# Patient Record
(demographics unavailable — no encounter records)

---

## 2024-10-29 NOTE — HISTORY OF PRESENT ILLNESS
[FreeTextEntry1] : f/u [de-identified] : Calcification of artery (440.9) (I70.90)  Ms. EMERSON DAY is a 75 year old Black or  Marcella female with history of breast fibrocystic disorder, T2DM, dyslipidemia, eczema, insomnia, midline cystocele, osteopenia, urge urinary incontinence, varicose vein presented today for f/u BP.  # HTN BP improved than prior. 145/80 Stressed low salt diet, slow down when changing positions and plenty of oral fluid intake day time. Continue Amlodipine 2.5mg po bid and Losartan 25mg po qd.  # accidental finding of BCA grade 2 calcification by mammo 7/3/24. BREAST ARTERIAL CALCIFICATION (HANNAH): Grade 2 - Coarse vascular calcification or tram track calcification affecting fewer than 3 vessels per mammographic view. Note: A strong association between breast arterial calcification and cardiovascular disease has been reported. Correlation with other cardiovascular risk factors is recommended. - 10 year ASCVD is elevated to 26.54%, Pt is on Atorvastatin 20mg po qd. - D/W Dr. Rivera. Made referral to Dr. Sharp, cardiology for further cardiac evaluation.  Answered all questions on her lab results on physical exam. RTO in 3months for DM, BP.

## 2025-01-15 NOTE — REASON FOR VISIT
[TextEntry] : C/O VULVOVAG ITCHING.  NO RECENT ABX.  DENIES DISCHARGE.  USED MONISTAT W PARTIAL IMPROVEMENT.  HAS GELHORN PESSARY- PESSARY OCCAS FALLS OUT W TAVIAIN BUT HAS NOT SEEN UROGYN IN RECENT MTHS

## 2025-01-15 NOTE — PHYSICAL EXAM
[Normal] : normal [Cystocele] : a cystocele [FreeTextEntry1] : MILD VULVAR ERYTHEMA;  VITELIGO [TextEntry] : GELHORN PESSARY REMOVED AND CLEANED W BETADINE AND REINSERTED

## 2025-01-22 NOTE — HISTORY OF PRESENT ILLNESS
[de-identified] : 75-year-old AA F with history of T2 DM, dyslipidemia, hypertension, osteopenia, here for BP follow-up.   Concerns 2025: -Hypertension-elevated systolic BP noted in June 2024, losartan 25 mg added to the amlodipine with dosage of losartan increased to 50 mg daily by cardiology follow-up September 2024.  She received cardiology referral after mammogram showed arterial calcifications.  Was referred for echocardiogram but has not gone yet.- [id number] metformin 500 mg twice daily, last A1c 6.7% June 2024 Hyperlipidemia with LDL 78 June 2024 Concerns 2025: Needs the second vaccine for shingles, first 1 received June 26, 2024

## 2025-03-05 NOTE — HISTORY OF PRESENT ILLNESS
[FreeTextEntry8] : Ms. EMERSON DAY is a 75 year old Black or  Marcella  female  with history of  breast fibrocystic disorder, T2DM, dyslipidemia, eczema, insomnia, midline cystocele, osteopenia, urge urinary incontinence, varicose vein  presented today for f/u fall yesterday. [Post-hospitalization from ___ Hospital] : Post-hospitalization from [unfilled] Hospital [Admitted on: ___] : The patient was admitted on [unfilled] [Discharged on ___] : discharged on [unfilled] [FreeTextEntry2] : Ms. EMERSON DAY is a 75 year old Black or  Marcella  female  with history of  presented today for comprehensive evaluation.

## 2025-03-26 NOTE — HISTORY OF PRESENT ILLNESS
[FreeTextEntry1] : Ms. DAY is a 75 year female here for cardiac evaluation after mammogram showed arterial calcifications.  Notable PMHx: - HTN - HLD - DM2 - Varicose Veins  - never smoker - 4 prior pregnancies uncomplicated   HPI: Since last visit, she had an admission to the hospital for mechanical fall with sprain to right thumb. Otherwise unremarkable workup. She is no longer taking her atorvastatin though she is unsure why, no side effects with this. She also has been taking losartan 25mg qd rather than 50mg as prescribed at last visit. She is on amlodipine 2.5mg BID but often forgets the evening dose. No chest pain, calf claudication, or dyspnea. TTE with mild MR and G2DD otherwise unremarkable.   9/11/2024 Here for cardiac evaluation after recent mammogram incidentally found breast arterial calcifications and the concern for association with cardiovascular disease. She reports no current issues and is feeling well. 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 She never has chest pain or dyspnea with this. Denies chest pain, palpitations, light-headedness/dizziness, syncope, orthopnea, PND, calf claudication.  Social Hx: never smoker, no alcohol use, retired, , 4 children, lives alone   Data Review: EKG - 4/19/2023: Sinus bradycardia with PACs. Voltage criteria for LVH Echo - 3/11/25 TTE: LVEF 60%, no rwma, G2DD, normal RV, mild MR, mild pHTN, no pericardial effusion, no prior echo Cath - no prior Cardiac CT - no prior Cardiac MRI - no prior Mammogram (7/3/2024) - BREAST ARTERIAL CALCIFICATION (HANNAH): Grade 2 -  Coarse vascular calcification or tram track calcification affecting fewer than 3 vessels per mammographic view. Note: A strong association between breast arterial calcification and cardiovascular disease has been reported. Correlation with other cardiovascular risk factors is recommended.

## 2025-03-26 NOTE — DISCUSSION/SUMMARY
[FreeTextEntry1] : Here for follow-up for HTN, HLDa,d and incidental breast arterial calcifications. Her recent BP has been mostly uncontrolled with G2DD on TTE. Given her suboptimal amlodipine adherence will change to 5mg qd  and continue losartan 25mg qd for now. Restart atorvastatin 20mg qd and repeat lipids prior to next visit.  # HTN - change amlodipine 2.5mg BID to 5mg qd - c/w losartan 25mg qd - BP log at home - CMP  # DM2 # HLD # ASCVD Risk >20% - restart atorvastatin 20mg qd - lipid panel   # Snoring # Mild pHTN - will discuss sleep apnea testing at future visit  RTC in 3 months following above testing or sooner PRN.

## 2025-03-26 NOTE — REASON FOR VISIT
[Home] : at home, [unfilled] , at the time of the visit. [Other Location: e.g. Home (Enter Location, City,State)___] : at [unfilled] [Telephone (audio)] : This telephonic visit was provided via audio only technology. [Patient preference] : patient preference. [Verbal consent obtained from patient] : the patient, [unfilled]

## 2025-06-25 NOTE — HISTORY OF PRESENT ILLNESS
[FreeTextEntry1] : Ms. DAY is a 76 year female here for cardiac evaluation after mammogram showed arterial calcifications.  Notable PMHx: - HTN - HLD - DM2 - Varicose Veins  - never smoker - 4 prior pregnancies uncomplicated   HPI: Since last visit, she had an admission to the hospital for mechanical fall with sprain to right thumb. Otherwise unremarkable workup. She is no longer taking her atorvastatin though she is unsure why, no side effects with this. She also has been taking losartan 25mg qd rather than 50mg as prescribed at last visit. She is on amlodipine 2.5mg BID but often forgets the evening dose. No chest pain, calf claudication, or dyspnea. TTE with mild MR and G2DD otherwise unremarkable.   9/11/2024 Here for cardiac evaluation after recent mammogram incidentally found breast arterial calcifications. She reports no current issues and is feeling well. 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 She never has chest pain or dyspnea with this. Denies chest pain, palpitations, light-headedness/dizziness, syncope, orthopnea, PND, calf claudication.  Social Hx: never smoker, no alcohol use, retired, , 4 children, lives alone   Data Review:  Echo - 3/11/25 TTE: LVEF 60%, no rwma, G2DD, normal RV, mild MR, mild pHTN, no pericardial effusion, no prior echo Cath -  Cardiac CT -  Cardiac MRI -  Mammogram (7/3/2024) - BREAST ARTERIAL CALCIFICATION (HANNAH): Grade 2 -  Coarse vascular calcification or tram track calcification affecting fewer than 3 vessels per mammographic view. Note: A strong association between breast arterial calcification and cardiovascular disease has been reported. Correlation with other cardiovascular risk factors is recommended.  ====  Here for follow-up for HTN, HLD, and incidental breast arterial calcifications. Her recent BP has been mostly uncontrolled with G2DD on TTE. Given her suboptimal amlodipine adherence will change to 5mg qd and continue losartan 25mg qd for now. Restart atorvastatin 20mg qd and repeat lipids prior to next visit.  # HTN - change amlodipine 2.5mg BID to 5mg qd - c/w losartan 25mg qd - BP log at home - CMP  # DM2 # HLD # ASCVD Risk >20% - restart atorvastatin 10mg qd - lipid panel  # Snoring # Mild pHTN - will discuss sleep apnea testing at future visit  RTC in 3 months following above testing or sooner PRN.

## 2025-06-25 NOTE — HISTORY OF PRESENT ILLNESS
[FreeTextEntry1] : Ms. DAY is a 76 year female here for cardiac evaluation after mammogram showed arterial calcifications.  Notable PMHx: - HTN - HLD - DM2 - Varicose Veins  - never smoker - 4 prior pregnancies uncomplicated   HPI: Since last visit, she had an admission to the hospital for mechanical fall with sprain to right thumb. Otherwise unremarkable workup. She is no longer taking her atorvastatin though she is unsure why, no side effects with this. She also has been taking losartan 25mg qd rather than 50mg as prescribed at last visit. She is on amlodipine 2.5mg BID but often forgets the evening dose. No chest pain, calf claudication, or dyspnea. TTE with mild MR and G2DD otherwise unremarkable.   9/11/2024 Here for cardiac evaluation after recent mammogram incidentally found breast arterial calcifications. She reports no current issues and is feeling well. 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 She never has chest pain or dyspnea with this. Denies chest pain, palpitations, light-headedness/dizziness, syncope, orthopnea, PND, calf claudication.  Social Hx: never smoker, no alcohol use, retired, , 4 children, lives alone   Data Review:  Echo - 3/11/25 TTE: LVEF 60%, no rwma, G2DD, normal RV, mild MR, mild pHTN, no pericardial effusion, no prior echo Cath -  Cardiac CT -  Cardiac MRI -  Mammogram (7/3/2024) - BREAST ARTERIAL CALCIFICATION (HANNAH): Grade 2 -  Coarse vascular calcification or tram track calcification affecting fewer than 3 vessels per mammographic view. Note: A strong association between breast arterial calcification and cardiovascular disease has been reported. Correlation with other cardiovascular risk factors is recommended.  ====  Here for follow-up for HTN, HLD, and incidental breast arterial calcifications. Her recent BP has been mostly uncontrolled with G2DD on TTE. Given her suboptimal amlodipine adherence will change to 5mg qd and continue losartan 25mg qd for now. Restart atorvastatin 20mg qd and repeat lipids prior to next visit.  # HTN - change amlodipine 2.5mg BID to 5mg qd - c/w losartan 25mg qd - BP log at home - CMP  # DM2 # HLD # ASCVD Risk >20% - restart atorvastatin 10mg qd - lipid panel  # Snoring # Mild pHTN - will discuss sleep apnea testing at future visit  RTC in 3 months following above testing or sooner PRN.

## 2025-07-01 NOTE — HISTORY OF PRESENT ILLNESS
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 Has 4 children (3 boys1 daughter) PMedHx: RLE cellulitis 2023 presented with swelling and warmth in right lower leg, c/w cellulitis x 3 weeks, no trauma, no hx gout, started on Augmentin and Doppler of RLE negative for DVT.  DM, started on metformin in Oct 2017 for  HbA1c 8.5% now 6.7%-6.8%  in [id number]  HTN, on Amlodipine Hyperlipidemia-on Atorvastatin 20mg.-LDL 75-79 -Trying to make healthier food choices, less carbs, weight in low 150's BMI 25.4 Hx of osteopenia-last bone density in 2021-has prescription from May 29, 2024 -Hx constipation-colonoscopy [id number] tubular adenomas, 2021-nl-f/u in 10 years  -Mammogram 7/3/2024-needs prescription for

## 2025-07-01 NOTE — HEALTH RISK ASSESSMENT
[No] : No [No falls in past year] : Patient reported no falls in the past year [0] : 2) Feeling down, depressed, or hopeless: Not at all (0) [PHQ-2 Negative - No further assessment needed] : PHQ-2 Negative - No further assessment needed [Audit-CScore] : 0 [[id number]] : 0

## 2025-07-01 NOTE — PHYSICAL EXAM
[Well Nourished] : well nourished [Well Developed] : well developed [Well-Appearing] : well-appearing [Normal Sclera/Conjunctiva] : normal sclera/conjunctiva [PERRL] : pupils equal round and reactive to light [EOMI] : extraocular movements intact [Normal Outer Ear/Nose] : the outer ears and nose were normal in appearance [Normal Oropharynx] : the oropharynx was normal [No JVD] : no jugular venous distention [No Lymphadenopathy] : no lymphadenopathy [Supple] : supple [Thyroid Normal, No Nodules] : the thyroid was normal and there were no nodules present [No Respiratory Distress] : no respiratory distress  [No Accessory Muscle Use] : no accessory muscle use [Clear to Auscultation] : lungs were clear to auscultation bilaterally [Normal Rate] : normal rate  [Regular Rhythm] : with a regular rhythm [Normal S1, S2] : normal S1 and S2 [No Murmur] : no murmur heard [No Carotid Bruits] : no carotid bruits [No Abdominal Bruit] : a ~M bruit was not heard ~T in the abdomen [No Varicosities] : no varicosities [Pedal Pulses Present] : the pedal pulses are present [No Edema] : there was no peripheral edema [No Palpable Aorta] : no palpable aorta [No Extremity Clubbing/Cyanosis] : no extremity clubbing/cyanosis [Normal Appearance] : normal in appearance [No Nipple Discharge] : no nipple discharge [No Axillary Lymphadenopathy] : no axillary lymphadenopathy [Soft] : abdomen soft [Non Tender] : non-tender [Non-distended] : non-distended [No Masses] : no abdominal mass palpated [No HSM] : no HSM [Normal Bowel Sounds] : normal bowel sounds [No CVA Tenderness] : no CVA  tenderness [No Spinal Tenderness] : no spinal tenderness [No Joint Swelling] : no joint swelling [Grossly Normal Strength/Tone] : grossly normal strength/tone [No Rash] : no rash [No Skin Lesions] : no skin lesions [Coordination Grossly Intact] : coordination grossly intact [No Focal Deficits] : no focal deficits [Normal Gait] : normal gait [Deep Tendon Reflexes (DTR)] : deep tendon reflexes were 2+ and symmetric [Normal Affect] : the affect was normal [Normal Insight/Judgement] : insight and judgment were intact [Comprehensive Foot Exam Normal] : Right and left foot were examined and both feet are normal. No ulcers in either foot. Toes are normal and with full ROM.  Normal tactile sensation with monofilament testing throughout both feet [de-identified] : slight warm noted half way up, improved form ast week exam [de-identified] : slight warmth and tenseness in skin noted, improved fromlast week , neg Scott's sign

## 2025-07-02 NOTE — HISTORY OF PRESENT ILLNESS
[FreeTextEntry1] : Ms. DAY is a 76 year female here for cardiac evaluation after mammogram showed arterial calcifications.  Notable PMHx: - Breast Arterial Calcifications - HTN - HLD - DM2 - Varicose Veins  - never smoker - 4 prior pregnancies uncomplicated   HPI: Since last visit, changed norvasc from 2.5mg BID to 5mg qd due compliance difficulty. Restarted statin  3/26/25 she had an admission to the hospital for mechanical fall with sprain to right thumb. Otherwise unremarkable workup. She is no longer taking her atorvastatin though she is unsure why, no side effects with this. She also has been taking losartan 25mg qd rather than 50mg as prescribed at last visit. She is on amlodipine 2.5mg BID but often forgets the evening dose. No chest pain, calf claudication, or dyspnea. TTE with mild MR and G2DD otherwise unremarkable.   9/11/2024 Here for cardiac evaluation after recent mammogram incidentally found breast arterial calcifications and the concern for association with cardiovascular disease. She reports no current issues and is feeling well. 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 She never has chest pain or dyspnea with this. Denies chest pain, palpitations, light-headedness/dizziness, syncope, orthopnea, PND, calf claudication.  Social Hx: never smoker, no alcohol use, retired, , 4 children, lives alone   Data Review: Echo - 3/11/25 TTE: LVEF 60%, no rwma, G2DD, normal RV, mild MR, mild pHTN, no pericardial effusion, no prior echo Cath - no prior Cardiac CT - no prior Cardiac MRI - no prior Mammogram (7/3/2024) - BREAST ARTERIAL CALCIFICATION (HANNAH): Grade 2 -  Coarse vascular calcification or tram track calcification affecting fewer than 3 vessels per mammographic view. Note: A strong association between breast arterial calcification and cardiovascular disease has been reported. Correlation with other cardiovascular risk factors is recommended.  ===  Here for follow-up for HTN, HLD, and and incidental breast arterial calcifications. Her recent BP has been mostly uncontrolled with G2DD on TTE. Given her suboptimal amlodipine adherence will change to 5mg qd and continue losartan 25mg qd for now. Restart atorvastatin 20mg qd and repeat lipids prior to next visit.  # HTN - c/w amlodipine 5mg qd - c/w losartan 25mg qd - BP log at home  # DM2 # HLD # ASCVD Risk >20% - c/w atorvastatin 20mg qd - check lipid panel  # Snoring # Mild pHTN - referral to sleep medicine  RTC in 6 months for sooner PRN

## 2025-07-28 NOTE — HEALTH RISK ASSESSMENT
[No] : No [No falls in past year] : Patient reported no falls in the past year [0] : 2) Feeling down, depressed, or hopeless: Not at all (0) [PHQ-2 Negative - No further assessment needed] : PHQ-2 Negative - No further assessment needed [Time Spent: ___ Minutes] : I spent [unfilled] minutes performing a depression screening for this patient. [Never] : Never [Audit-CScore] : 0 [[id number]] : 0

## 2025-07-28 NOTE — HISTORY OF PRESENT ILLNESS
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 Has 4 children (3 boys1 daughter) Hx cellulitis 2023 right lower leg, no trauma, no hx gout, given Augmentin and Doppler of RLE negative for DVT. Here to f/u DM, started on metformin 500mg bid in Oct 2017 for  HbA1c 8.5% now 6.7% on 2023, 6.8% 3/5/25 Here to f/u HTN, on Amlodipine  5mg OD, Losartan 25mg od 130-150/70-80 Hx hyperlipidemia-on Atorvastatin 20mg.-LDL 75-79 but skiping and LDL >100-Established with cardio  DR Garza, skipping statin and advsed to restart statin but LDL cholestrol still >100 Trying to make healthier food choices, less carbs, weight in low 150's BMI 25.29 Hx of osteopenia-last bone density in 2021-f/u 2 years or , overdue and rx for  to be given NOtes increase constipation, last colonoscopy [id number] tubular adenomas, 2021-nl-f/u in 10 years  Due for mammogram , last 7/3/24

## 2025-07-28 NOTE — PHYSICAL EXAM
[Well Nourished] : well nourished [Well Developed] : well developed [Well-Appearing] : well-appearing [Normal Sclera/Conjunctiva] : normal sclera/conjunctiva [PERRL] : pupils equal round and reactive to light [EOMI] : extraocular movements intact [Normal Outer Ear/Nose] : the outer ears and nose were normal in appearance [Normal Oropharynx] : the oropharynx was normal [No JVD] : no jugular venous distention [No Lymphadenopathy] : no lymphadenopathy [Supple] : supple [Thyroid Normal, No Nodules] : the thyroid was normal and there were no nodules present [No Respiratory Distress] : no respiratory distress  [No Accessory Muscle Use] : no accessory muscle use [Clear to Auscultation] : lungs were clear to auscultation bilaterally [Normal Rate] : normal rate  [Regular Rhythm] : with a regular rhythm [Normal S1, S2] : normal S1 and S2 [No Murmur] : no murmur heard [No Carotid Bruits] : no carotid bruits [No Abdominal Bruit] : a ~M bruit was not heard ~T in the abdomen [No Varicosities] : no varicosities [Pedal Pulses Present] : the pedal pulses are present [No Edema] : there was no peripheral edema [No Palpable Aorta] : no palpable aorta [No Extremity Clubbing/Cyanosis] : no extremity clubbing/cyanosis [Normal Appearance] : normal in appearance [No Nipple Discharge] : no nipple discharge [No Axillary Lymphadenopathy] : no axillary lymphadenopathy [Soft] : abdomen soft [Non Tender] : non-tender [Non-distended] : non-distended [No Masses] : no abdominal mass palpated [No HSM] : no HSM [Normal Bowel Sounds] : normal bowel sounds [No CVA Tenderness] : no CVA  tenderness [No Spinal Tenderness] : no spinal tenderness [No Joint Swelling] : no joint swelling [Grossly Normal Strength/Tone] : grossly normal strength/tone [No Rash] : no rash [No Skin Lesions] : no skin lesions [Coordination Grossly Intact] : coordination grossly intact [No Focal Deficits] : no focal deficits [Normal Gait] : normal gait [Deep Tendon Reflexes (DTR)] : deep tendon reflexes were 2+ and symmetric [Normal Affect] : the affect was normal [Normal Insight/Judgement] : insight and judgment were intact [Comprehensive Foot Exam Normal] : Right and left foot were examined and both feet are normal. No ulcers in either foot. Toes are normal and with full ROM.  Normal tactile sensation with monofilament testing throughout both feet [de-identified] : slight warm noted half way up, improved form ast week exam [de-identified] : slight warmth and tenseness in skin noted, improved fromlast week , neg Scott's sign

## 2025-07-28 NOTE — HEALTH RISK ASSESSMENT
[No] : No [No falls in past year] : Patient reported no falls in the past year [0] : 2) Feeling down, depressed, or hopeless: Not at all (0) [PHQ-2 Negative - No further assessment needed] : PHQ-2 Negative - No further assessment needed [Time Spent: ___ Minutes] : I spent [unfilled] minutes performing a depression screening for this patient. [Never] : Never [Audit-CScore] : 0 [[id number]] : 0

## 2025-07-28 NOTE — ASSESSMENT
[Vaccines Reviewed] : Immunizations reviewed today. Please see immunization details in the vaccine log within the immunization flowsheet.  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 Has 4 children (3 boys1 daughter) Hx cellulitis 2023 right lower leg, no trauma, no hx gout, given Augmentin and Doppler of RLE negative for DVT. Here to f/u DM, started on metformin 500mg bid in Oct 2017 for  HbA1c 8.5% now 6.7% on 2023, 6.8% 3/5/25 Here to f/u HTN, on Amlodipine  5mg OD, Losartan 25mg od 130-150/70-80 Hx hyperlipidemia-on Atorvastatin 20mg.-LDL 75-79 but skiping and LDL >100-Established with cardio  DR Garza, skipping statin and advsed to restart statin but LDL cholestrol still >100 Trying to make healthier food choices, less carbs, weight in low 150's BMI 25.29 Hx of osteopenia-last bone density in 2021-f/u 2 years or , overdue and rx for  to be given NOtes increase constipation, last colonoscopy [id number] tubular adenomas, 2021-nl-f/u in 10 years  Due for mammogram , last 7/3/24  2025-need to reinforce compliance to medications, as based on Dr. Goodman's note from 2024, patient sometimes forgets to take the afternoon/evening medications and had even admitted to stopping the atorvastatin 20 mg which was restarted by cardiology

## 2025-07-28 NOTE — PHYSICAL EXAM
[Well Nourished] : well nourished [Well Developed] : well developed [Well-Appearing] : well-appearing [Normal Sclera/Conjunctiva] : normal sclera/conjunctiva [PERRL] : pupils equal round and reactive to light [EOMI] : extraocular movements intact [Normal Outer Ear/Nose] : the outer ears and nose were normal in appearance [Normal Oropharynx] : the oropharynx was normal [No JVD] : no jugular venous distention [No Lymphadenopathy] : no lymphadenopathy [Supple] : supple [Thyroid Normal, No Nodules] : the thyroid was normal and there were no nodules present [No Respiratory Distress] : no respiratory distress  [No Accessory Muscle Use] : no accessory muscle use [Clear to Auscultation] : lungs were clear to auscultation bilaterally [Normal Rate] : normal rate  [Regular Rhythm] : with a regular rhythm [Normal S1, S2] : normal S1 and S2 [No Murmur] : no murmur heard [No Carotid Bruits] : no carotid bruits [No Abdominal Bruit] : a ~M bruit was not heard ~T in the abdomen [No Varicosities] : no varicosities [Pedal Pulses Present] : the pedal pulses are present [No Edema] : there was no peripheral edema [No Palpable Aorta] : no palpable aorta [No Extremity Clubbing/Cyanosis] : no extremity clubbing/cyanosis [Normal Appearance] : normal in appearance [No Nipple Discharge] : no nipple discharge [No Axillary Lymphadenopathy] : no axillary lymphadenopathy [Soft] : abdomen soft [Non Tender] : non-tender [Non-distended] : non-distended [No Masses] : no abdominal mass palpated [No HSM] : no HSM [Normal Bowel Sounds] : normal bowel sounds [No CVA Tenderness] : no CVA  tenderness [No Spinal Tenderness] : no spinal tenderness [No Joint Swelling] : no joint swelling [Grossly Normal Strength/Tone] : grossly normal strength/tone [No Rash] : no rash [No Skin Lesions] : no skin lesions [Coordination Grossly Intact] : coordination grossly intact [No Focal Deficits] : no focal deficits [Normal Gait] : normal gait [Deep Tendon Reflexes (DTR)] : deep tendon reflexes were 2+ and symmetric [Normal Affect] : the affect was normal [Normal Insight/Judgement] : insight and judgment were intact [Comprehensive Foot Exam Normal] : Right and left foot were examined and both feet are normal. No ulcers in either foot. Toes are normal and with full ROM.  Normal tactile sensation with monofilament testing throughout both feet [de-identified] : slight warm noted half way up, improved form ast week exam [de-identified] : slight warmth and tenseness in skin noted, improved fromlast week , neg Scott's sign

## 2025-07-28 NOTE — HISTORY OF PRESENT ILLNESS
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 Has 4 children (3 boys1 daughter) Hx cellulitis 2023 right lower leg, no trauma, no hx gout, given Augmentin and Doppler of RLE negative for DVT. Here to f/u DM, started on metformin 500mg bid in Oct 2017 for  HbA1c 8.5% now 6.7% on 2023, 6.8% 3/5/25 Here to f/u HTN, on Amlodipine  5mg OD, Losartan 25mg od 130-150/70-80 Hx hyperlipidemia-on Atorvastatin 20mg.-LDL 75-79 but skiping and LDL >100-Established with cardio  DR Garza, skipping statin and advsed to restart statin but LDL cholestrol still >100 Trying to make healthier food choices, less carbs, weight in low 150's BMI 25.29 Hx of osteopenia-last bone density in 2021-f/u 2 years or , overdue and rx for  to be given NOtes increase constipation, last colonoscopy [id number] tubular adenomas, 2021-nl-f/u in 10 years  Due for mammogram , last 7/3/24

## 2025-07-28 NOTE — ASSESSMENT
[Vaccines Reviewed] : Immunizations reviewed today. Please see immunization details in the vaccine log within the immunization flowsheet.  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 Has 4 children (3 boys1 daughter) Hx cellulitis 2023 right lower leg, no trauma, no hx gout, given Augmentin and Doppler of RLE negative for DVT. Here to f/u DM, started on metformin 500mg bid in Oct 2017 for  HbA1c 8.5% now 6.7% on 2023, 6.8% 3/5/25 Here to f/u HTN, on Amlodipine  5mg OD, Losartan 25mg od 130-150/70-80 Hx hyperlipidemia-on Atorvastatin 20mg.-LDL 75-79 but skiping and LDL >100-Established with cardio  DR Garza, skipping statin and advsed to restart statin but LDL cholestrol still >100 Trying to make healthier food choices, less carbs, weight in low 150's BMI 25.29 Hx of osteopenia-last bone density in 2021-f/u 2 years or , overdue and rx for  to be given NOtes increase constipation, last colonoscopy [id number] tubular adenomas, 2021-nl-f/u in 10 years  Due for mammogram , last 7/3/24  2025-need to reinforce compliance to medications, as based on Dr. Goodman's note from 2024, patient sometimes forgets to take the afternoon/evening medications and had even admitted to stopping the atorvastatin 20 mg which was restarted by cardiology